# Patient Record
Sex: FEMALE | Race: WHITE | NOT HISPANIC OR LATINO | Employment: OTHER | URBAN - METROPOLITAN AREA
[De-identification: names, ages, dates, MRNs, and addresses within clinical notes are randomized per-mention and may not be internally consistent; named-entity substitution may affect disease eponyms.]

---

## 2020-11-17 ENCOUNTER — TELEPHONE (OUTPATIENT)
Dept: OBSTETRICS AND GYNECOLOGY | Facility: CLINIC | Age: 75
End: 2020-11-17

## 2020-11-17 NOTE — TELEPHONE ENCOUNTER
Spoke with patient. Two pt identifiers confirmed.  Pt will call back mid January to schedule.. Patient verbalized understanding.

## 2020-11-17 NOTE — TELEPHONE ENCOUNTER
----- Message from Vidya Jones sent at 11/17/2020  1:53 PM CST -----  Regarding: patient appointment access  Contact: patient  Patient would like to schedule a new patient GYN appointment with Dr. Love. Patient has Medicare and is 75. Please call back at [phone].

## 2020-11-17 NOTE — TELEPHONE ENCOUNTER
----- Message from Vidya Jones sent at 11/17/2020  3:03 PM CST -----  Regarding: patient returning a call  Contact: patient  Type:  Patient Returning Call    Who Called:patient  Who Left Message for Patient:nurse  Does the patient know what this is regarding?:yes  Would the patient rather a call back or a response via [facility]? Call back  Best Call Back Number:[phone]   Additional Information: n/a

## 2021-01-27 ENCOUNTER — TELEPHONE (OUTPATIENT)
Dept: OBSTETRICS AND GYNECOLOGY | Facility: CLINIC | Age: 76
End: 2021-01-27

## 2021-03-09 ENCOUNTER — OFFICE VISIT (OUTPATIENT)
Dept: OBSTETRICS AND GYNECOLOGY | Facility: CLINIC | Age: 76
End: 2021-03-09
Payer: MEDICARE

## 2021-03-09 VITALS
HEIGHT: 68 IN | BODY MASS INDEX: 28.44 KG/M2 | DIASTOLIC BLOOD PRESSURE: 74 MMHG | WEIGHT: 187.63 LBS | SYSTOLIC BLOOD PRESSURE: 164 MMHG | HEART RATE: 93 BPM

## 2021-03-09 DIAGNOSIS — N95.1 MENOPAUSAL AND FEMALE CLIMACTERIC STATES: ICD-10-CM

## 2021-03-09 DIAGNOSIS — N95.2 ATROPHIC VAGINITIS: ICD-10-CM

## 2021-03-09 DIAGNOSIS — B37.2 YEAST DERMATITIS: ICD-10-CM

## 2021-03-09 DIAGNOSIS — Z01.419 ENCOUNTER FOR WELL WOMAN EXAM WITH ROUTINE GYNECOLOGICAL EXAM: Primary | ICD-10-CM

## 2021-03-09 PROCEDURE — G0101 PR CA SCREEN;PELVIC/BREAST EXAM: ICD-10-PCS | Mod: S$GLB,,, | Performed by: OBSTETRICS & GYNECOLOGY

## 2021-03-09 PROCEDURE — G0101 CA SCREEN;PELVIC/BREAST EXAM: HCPCS | Mod: S$GLB,,, | Performed by: OBSTETRICS & GYNECOLOGY

## 2021-03-09 RX ORDER — CLOTRIMAZOLE AND BETAMETHASONE DIPROPIONATE 10; .64 MG/G; MG/G
CREAM TOPICAL
Qty: 15 G | Refills: 1 | Status: SHIPPED | OUTPATIENT
Start: 2021-03-09 | End: 2021-10-18

## 2021-03-09 RX ORDER — IRBESARTAN 300 MG/1
300 TABLET ORAL DAILY
COMMUNITY
Start: 2021-02-17

## 2021-03-09 RX ORDER — LEVOTHYROXINE SODIUM 50 UG/1
TABLET ORAL
COMMUNITY
Start: 2021-02-22

## 2021-03-09 RX ORDER — HYDROCHLOROTHIAZIDE 25 MG/1
25 TABLET ORAL DAILY
COMMUNITY
Start: 2021-02-08 | End: 2023-03-14

## 2021-03-09 RX ORDER — EZETIMIBE 10 MG/1
TABLET ORAL
COMMUNITY
Start: 2020-12-03

## 2021-10-17 DIAGNOSIS — B37.2 YEAST DERMATITIS: ICD-10-CM

## 2021-10-18 RX ORDER — CLOTRIMAZOLE AND BETAMETHASONE DIPROPIONATE 10; .64 MG/G; MG/G
CREAM TOPICAL
Qty: 15 G | Refills: 1 | Status: SHIPPED | OUTPATIENT
Start: 2021-10-18 | End: 2022-02-11 | Stop reason: SDUPTHER

## 2022-06-27 ENCOUNTER — TELEPHONE (OUTPATIENT)
Dept: OBSTETRICS AND GYNECOLOGY | Facility: CLINIC | Age: 77
End: 2022-06-27
Payer: MEDICARE

## 2022-06-27 NOTE — TELEPHONE ENCOUNTER
I spoke with vitor and scheduled her annual visit for July. She had to cancel her appointment in March because her  was dying and she just couldn't handle it.

## 2022-06-27 NOTE — TELEPHONE ENCOUNTER
----- Message from Shirley Carney sent at 6/27/2022 10:40 AM CDT -----  Regarding: Annual  Contact: Patient  Please call to schedule patient's annual with Dr Love at ph .[phone] (home)

## 2022-07-18 ENCOUNTER — TELEPHONE (OUTPATIENT)
Dept: OBSTETRICS AND GYNECOLOGY | Facility: CLINIC | Age: 77
End: 2022-07-18
Payer: MEDICARE

## 2022-07-18 NOTE — TELEPHONE ENCOUNTER
Attempted to contact patient, no answer. Left patient voice mail to return call to clinic regarding her wellness appointment that is scheduled too early.

## 2023-03-13 NOTE — PROGRESS NOTES
CC:  WELL WOMAN (menopausal since   )  Patient Care Team:  Leonard Duenas MD as PCP - General (Family Medicine)  Sami Weiss MD (Interventional Cardiology)    HPI:  Patient is a 77 y.o. who presents for her well woman exam today.  History reviewed with patient. Since last visit, pt lost her  (about a yr ago) but she is doing well with that now. Also had a heart attack and didn't know it but went to get checked for coivd when she had a cough and ended up not having covid but needed 4 stents  Patient is without complaints or concerns today.     The patient's last visit with me was on 3/9/2021 for wwe    HRT:  vaginal estrogen  HX ABNL PAPS: none    REVIEW OF PRIOR DATA/ HEALTH MAINTENANCE:  LAST ANNUAL:   2021    LAST MMG (screening)- 2022- normal at [facility]   LAST LABS- up to date with PCP     LAST COLOGARD-  - neg- Q 3 years   LAST DEXA- - unknown results at [facility]     Past Medical History:   Diagnosis Date    Atrophic vaginitis     Coronary artery disease     Elevated cholesterol     HTN (hypertension)     Hypothyroid     Menopausal and female climacteric states     Polymyalgia     Postmenopausal atrophic vaginitis     HRT    Yeast dermatitis      SURGICAL HX:   has a past surgical history that includes Cataract extraction; Blepharoptosis repair; and Coronary angioplasty with stent.    SOCIAL HX:    reports that she has never smoked. She has never used smokeless tobacco. She reports that she does not currently use alcohol. She reports that she does not use drugs.    FAMILY HX:   family history includes Breast cancer in her sister; Diabetes in her mother; Heart disease in her father; Liver disease in her mother. .    ALLERGIES:  Zithromax [azithromycin]    Current Outpatient Medications   Medication Instructions    amLODIPine (NORVASC) 5 MG tablet No dose, route, or frequency recorded.    clopidogreL (PLAVIX) 75 mg, Oral    clotrimazole-betamethasone 1-0.05% (LOTRISONE)  "cream APPLY EXTERNALLY TO THE AFFECTED AREA TWICE DAILY    [START ON 3/16/2023] conjugated estrogens (PREMARIN) 0.5 g, Vaginal, Twice weekly    ezetimibe (ZETIA) 10 mg tablet TK 1 T PO D    furosemide (LASIX) 20 MG tablet No dose, route, or frequency recorded.    irbesartan (AVAPRO) 300 mg, Oral, Daily    levothyroxine (SYNTHROID) 50 MCG tablet No dose, route, or frequency recorded.    metoprolol succinate (TOPROL-XL) 50 MG 24 hr tablet No dose, route, or frequency recorded.    PROLENSA 0.07 % Drop INSTILL 1 DROP IN LEFT EYE DAILY AS DIRECTED    REPATHA SURECLICK 140 mg/mL PnIj INJECT 1 PEN SUBCUTANEOUSLY EVERY 2 WEEKS     ROS:  CONST:  No fever, chills, fatigue or unexpected changes in weight  CV: No chest pain or palpitations  RESP:  No shortness of breath or cough  GI: No abd pain, vomiting, diarrhea, blood in stool, or changes in bowel mvmts  SKIN: No rashes or lesions  MUSCULOSKELETAL: No joint swelling or pain  PSYCH: No changes in mood or insomia  BREASTS: No asymmetry, lumps, pain, nipple discharge, or skin changes  :  No dysuria, urgency, frequency, hematuria or incontinence          No vag dc, itching, odor or dryness          No pelvic pain, dyspareunia, or abnormal vaginal bleeding    VITALS:  Blood pressure 128/60, pulse 65, height 5' 8" (1.727 m), weight 81.5 kg (179 lb 9.6 oz).  Body mass index is 27.31 kg/m².     PHYSICAL EXAM-  APPEARANCE: Well appearing, in no acute distress.  NECK: Neck symmetric without masses or thyromegaly.  CV:  Normal rate  PULM: Normal resp rate, no resp distress, normal resp effort  PSYCH:  Normal mood and affect, cooperative  SKIN: No rashes, lesions, or abnormal bruising  LYMPH: No inguinal or axillary adenopathy  ABD: Soft, without tenderness or masses. No palpable hernias or hsm  BREAST: Symmetrical, no nipple changes, no skin changes, No palpable masses   PELVIC:  VULVA: Normal female genitalia. No lesions.  URETHRAL MEATUS: No masses, no prolapse.  BLADDER/ URETHRA: " No masses or suprapubic tenderness  VAGINA: No lesions. +atrophic changes. No discharge  CVX: No lesions  UTERUS: Normal size/shape, mobile, non-tender  ADNEXA: No masses, tenderness, or fullness  ANUS/ PERINEUM: Normal tone.  No lesions.    *female chaperone present for entire exam    ASSESSMENT and PLAN:  Encounter for well woman exam with routine gynecological exam    Breast cancer screening by mammogram  -     Mammo Digital Screening [facility] w/ Jhonathan; Future; Expected date: 03/28/2023    Encounter for osteoporosis screening in asymptomatic postmenopausal patient  -     DXA Bone Density Axial Skeleton 1 or more sites; Future; Expected date: 03/28/2023    Atrophic vaginitis  -     conjugated estrogens (PREMARIN) vaginal cream; Place 0.5 g vaginally twice a week.  Dispense: 30 g; Refill: 4       FOLLOWUP:  1 year for wwe or sooner prn    COUNSELING:  Patient was counseled today on recommendation for yearly pelvic exam, current Pap guidelines, self breast exams, annual screening mammograms, routine screening colonoscopy , and bone density testing.  Discussed vitamin D and calcium supplements as well as weight bearing exercise to decrease risks. Encouraged patient to see her PCP for other health maintenance.

## 2023-03-14 ENCOUNTER — OFFICE VISIT (OUTPATIENT)
Dept: OBSTETRICS AND GYNECOLOGY | Facility: CLINIC | Age: 78
End: 2023-03-14
Payer: MEDICARE

## 2023-03-14 VITALS
BODY MASS INDEX: 27.22 KG/M2 | HEIGHT: 68 IN | SYSTOLIC BLOOD PRESSURE: 128 MMHG | HEART RATE: 65 BPM | WEIGHT: 179.63 LBS | DIASTOLIC BLOOD PRESSURE: 60 MMHG

## 2023-03-14 DIAGNOSIS — Z78.0 ENCOUNTER FOR OSTEOPOROSIS SCREENING IN ASYMPTOMATIC POSTMENOPAUSAL PATIENT: ICD-10-CM

## 2023-03-14 DIAGNOSIS — Z12.31 BREAST CANCER SCREENING BY MAMMOGRAM: ICD-10-CM

## 2023-03-14 DIAGNOSIS — Z01.419 ENCOUNTER FOR WELL WOMAN EXAM WITH ROUTINE GYNECOLOGICAL EXAM: Primary | ICD-10-CM

## 2023-03-14 DIAGNOSIS — N95.2 ATROPHIC VAGINITIS: ICD-10-CM

## 2023-03-14 DIAGNOSIS — Z13.820 ENCOUNTER FOR OSTEOPOROSIS SCREENING IN ASYMPTOMATIC POSTMENOPAUSAL PATIENT: ICD-10-CM

## 2023-03-14 PROCEDURE — G0101 CA SCREEN;PELVIC/BREAST EXAM: HCPCS | Mod: S$GLB,,, | Performed by: OBSTETRICS & GYNECOLOGY

## 2023-03-14 PROCEDURE — G0101 PR CA SCREEN;PELVIC/BREAST EXAM: ICD-10-PCS | Mod: S$GLB,,, | Performed by: OBSTETRICS & GYNECOLOGY

## 2023-03-14 RX ORDER — FUROSEMIDE 20 MG/1
TABLET ORAL
COMMUNITY
Start: 2023-02-13

## 2023-03-14 RX ORDER — CLOPIDOGREL BISULFATE 75 MG/1
75 TABLET ORAL
COMMUNITY
Start: 2023-02-15

## 2023-03-14 RX ORDER — METOPROLOL SUCCINATE 50 MG/1
TABLET, EXTENDED RELEASE ORAL
COMMUNITY
Start: 2023-03-10

## 2023-03-14 RX ORDER — AMLODIPINE BESYLATE 5 MG/1
TABLET ORAL
COMMUNITY
Start: 2023-03-10

## 2023-03-14 RX ORDER — BROMFENAC SODIUM 0.7 MG/ML
SOLUTION/ DROPS OPHTHALMIC
COMMUNITY
Start: 2023-01-02

## 2023-03-14 RX ORDER — EVOLOCUMAB 140 MG/ML
INJECTION, SOLUTION SUBCUTANEOUS
COMMUNITY
Start: 2023-02-21

## 2023-03-29 NOTE — PROGRESS NOTES
Please let pt know her dexa shows osteoporosis but I dont have her last one for comparison. I would send this to her pcp and have them take a look and make recommendations

## 2023-03-30 ENCOUNTER — TELEPHONE (OUTPATIENT)
Dept: OBSTETRICS AND GYNECOLOGY | Facility: CLINIC | Age: 78
End: 2023-03-30
Payer: MEDICARE

## 2023-04-10 ENCOUNTER — TELEPHONE (OUTPATIENT)
Dept: OBSTETRICS AND GYNECOLOGY | Facility: CLINIC | Age: 78
End: 2023-04-10
Payer: MEDICARE

## 2024-04-04 ENCOUNTER — OFFICE VISIT (OUTPATIENT)
Dept: OBSTETRICS AND GYNECOLOGY | Facility: CLINIC | Age: 79
End: 2024-04-04
Payer: MEDICARE

## 2024-04-04 VITALS
WEIGHT: 187 LBS | DIASTOLIC BLOOD PRESSURE: 65 MMHG | SYSTOLIC BLOOD PRESSURE: 144 MMHG | HEIGHT: 68 IN | BODY MASS INDEX: 28.34 KG/M2

## 2024-04-04 DIAGNOSIS — Z12.31 BREAST CANCER SCREENING BY MAMMOGRAM: Primary | ICD-10-CM

## 2024-04-04 DIAGNOSIS — N95.2 ATROPHIC VAGINITIS: ICD-10-CM

## 2024-04-04 DIAGNOSIS — N95.1 MENOPAUSAL STATE: ICD-10-CM

## 2024-04-04 PROCEDURE — 99459 PELVIC EXAMINATION: CPT | Mod: S$GLB,,, | Performed by: OBSTETRICS & GYNECOLOGY

## 2024-04-04 PROCEDURE — 99214 OFFICE O/P EST MOD 30 MIN: CPT | Mod: S$GLB,,, | Performed by: OBSTETRICS & GYNECOLOGY

## 2024-04-04 RX ORDER — METOPROLOL TARTRATE 50 MG/1
50 TABLET ORAL 2 TIMES DAILY
COMMUNITY
Start: 2024-01-09

## 2024-04-04 NOTE — PROGRESS NOTES
FOLLOW UP VISIT - menopausal since   Patient Care Team:  Leonard Duenas MD as PCP - General (Family Medicine)  Sami Weiss MD (Interventional Cardiology)    CC:  breast check, menopausal state, atrophic vaginitis    HPI:  Pt is a 78 y.o.  who is here for her breast check. Pt feels like she is stronger and better than the last year as it has been right at 2 years since her  passed away and has finally finished all the legal stuff with that. Sees cardiology this afternoon. Exercising using her recumbent bike and ballet bar. Uses her premarin cream twice weekly when she feels she is getting dry but has had no problems , dc, utis, or any other concerns. Is thinking she may just put it on the calendar to use it a couple     The patient's last visit with me was on 3/14/2023 for wwe    REVIEW OF PRIOR DATA/ HEALTH MAINTENANCE:  LAST ANNUAL:   2023   LAST LABS- does with PCP   LAST MMG (screening)- 2023-  [facility] COLOGARD-  - neg- Q 3 years   LAST DEXA- - osteoporosis at [facility] ABNL PAPS: none    HRT:  never used systemic hrt and  is CONTRAINDICATED    Past Medical History:   Diagnosis Date    Coronary artery disease     Elevated cholesterol     HTN (hypertension)     Hypothyroid     Menopausal and female climacteric states     Polymyalgia     Postmenopausal atrophic vaginitis     HRT     SURGICAL HX:   has a past surgical history that includes Cataract extraction; Blepharoptosis repair; and Coronary angioplasty with stent.  Family, obstetric, and social history reviewed and updated    Current Outpatient Medications   Medication Sig    amLODIPine (NORVASC) 5 MG tablet     clopidogreL (PLAVIX) 75 mg tablet Take 75 mg by mouth.    clotrimazole-betamethasone 1-0.05% (LOTRISONE) cream APPLY EXTERNALLY TO THE AFFECTED AREA TWICE DAILY    ezetimibe (ZETIA) 10 mg tablet TK 1 T PO D    furosemide (LASIX) 20 MG tablet     irbesartan (AVAPRO) 300 MG tablet Take 300 mg by  "mouth once daily.    levothyroxine (SYNTHROID) 50 MCG tablet     metoprolol tartrate (LOPRESSOR) 50 MG tablet Take 50 mg by mouth 2 (two) times daily.    PROLENSA 0.07 % Drop INSTILL 1 DROP IN LEFT EYE DAILY AS DIRECTED    REPATHA SURECLICK 140 mg/mL PnIj INJECT 1 PEN SUBCUTANEOUSLY EVERY 2 WEEKS    conjugated estrogens (PREMARIN) vaginal cream Place 0.5 g vaginally twice a week.    metoprolol succinate (TOPROL-XL) 50 MG 24 hr tablet      No current facility-administered medications for this visit.     VITALS:  Blood pressure (!) 144/65, height 5' 8" (1.727 m), weight 84.8 kg (187 lb).  Body mass index is 28.43 kg/m².    PHYSICAL EXAM-  APPEARANCE: Well appearing, in no acute distress.   CV/PULM: No resp distress, normal resp effort   PSYCH:  Normal mood and affect, cooperative   SKIN: No rashes, lesions, or abnormal bruising   ABD: Soft, without tenderness or masses.   BREAST: No skin changes or nipple dc. No palpable masses or tenderness  PELVIC:  VULVA: Normal female genitalia. No lesions.   URETHRAL MEATUS: No masses, no significant prolapse.   BLADDER/ URETHRA: No masses or suprapubic tenderness   VAGINA/ CVX: No lesions. +atrophic changes. No discharge   UTERUS: Normal size/shape, mobile, non-tender   ADNEXA: No masses, tenderness, or fullness   ANUS/ PERINEUM: Normal tone.  No lesions.           *patient verbally consented to exam and female chaperone present for entire exam     ASSESSMENT/ PLAN:  Breast cancer screening by mammogram  -     Mammo Digital Screening [facility]/ [facility]honathan; Future; Expected date: 04/18/2024    Menopausal state    Atrophic vaginitis  -     conjugated estrogens (PREMARIN) vaginal cream; Place 0.5 g vaginally twice a week.  Dispense: 30 g; Refill: 4      FOLLOWUP:  WWE or sooner prn        "

## 2024-05-03 ENCOUNTER — TELEPHONE (OUTPATIENT)
Dept: OBSTETRICS AND GYNECOLOGY | Facility: CLINIC | Age: 79
End: 2024-05-03
Payer: MEDICARE

## 2024-05-30 ENCOUNTER — DOCUMENTATION ONLY (OUTPATIENT)
Dept: OBSTETRICS AND GYNECOLOGY | Facility: CLINIC | Age: 79
End: 2024-05-30
Payer: MEDICARE

## 2025-04-16 NOTE — PROGRESS NOTES
FOLLOW UP VISIT - menopausal since   Patient Care Team:  Leonard Duenas MD as PCP - General (Family Medicine)  Sami Weiss MD (Interventional Cardiology)    CC:  breast check, menopausal state,   HPI:  Pt is a 79 y.o.  who is here for her breast check, needs rf on her boston vag cream- uses prn , not on a regular scheduled basis. Also wants a rf on her lotrisone cream that she uses for yeast in the folds    REVIEW OF PRIOR DATA/ HEALTH MAINTENANCE:  LAST ANNUAL:   2023    LAST MMG- 2024-  [facility] COLOGARD-  - neg- Q 3 years   LAST DEXA- - osteoporosis at [facility]    HRT- none  HX ABNL PAPS: none    Past Medical History:   Diagnosis Date    Coronary artery disease     Elevated cholesterol     HTN (hypertension)     Hypothyroid     Menopausal and female climacteric states     Polymyalgia     Postmenopausal atrophic vaginitis     HRT     SURGICAL HX:   has a past surgical history that includes Cataract extraction; Blepharoptosis repair; and Coronary angioplasty with stent.  Family, obstetric, and social history reviewed and updated    Current Outpatient Medications   Medication Instructions    amLODIPine (NORVASC) 5 MG tablet No dose, route, or frequency recorded.    azelaic acid (AZELEX) 15 % gel APPLY TO THE AFFECTED AREAS ONCE DAILY    azelastine (ASTELIN) 137 mcg (0.1 %) nasal spray 2 sprays, 2 times daily    bromfenac (XIBROM) 0.09 % ophthalmic solution INSTILL 1 DROP IN LEFT EYE EVERY DAY    clobetasol 0.05% (TEMOVATE) 0.05 % Oint 1 application , 2 times daily    clopidogreL (PLAVIX) 75 mg    clotrimazole-betamethasone 1-0.05% (LOTRISONE) cream APPLY EXTERNALLY TO THE AFFECTED AREA TWICE DAILY    conjugated estrogens (PREMARIN) 0.5 g, Vaginal, Twice weekly    difluprednate (DUREZOL) 0.05 % Drop ophthalmic solution INSTILL 1 DROP IN LEFT EYE DAILY    erythromycin (ROMYCIN) ophthalmic ointment APPLY A SMALL AMOUNT ALONG LEFT LOWER LID THREE TIMES DAILY    ezetimibe  "(ZETIA) 10 mg tablet TK 1 T PO D    furosemide (LASIX) 20 MG tablet No dose, route, or frequency recorded.    irbesartan (AVAPRO) 300 mg, Daily    levothyroxine (SYNTHROID) 50 MCG tablet No dose, route, or frequency recorded.    metoprolol succinate (TOPROL-XL) 50 MG 24 hr tablet No dose, route, or frequency recorded.    PROLENSA 0.07 % Drop INSTILL 1 DROP IN LEFT EYE DAILY AS DIRECTED    REPATHA SURECLICK 140 mg/mL PnIj INJECT 1 PEN SUBCUTANEOUSLY EVERY 2 WEEKS     VITALS:  Blood pressure 139/76, height 5' 8" (1.727 m), weight 83.5 kg (184 lb).  Body mass index is 27.98 kg/m².    PHYSICAL EXAM-  APPEARANCE: Well appearing, in no acute distress.   CV/PULM: No resp distress, normal resp effort   PSYCH:  Normal mood and affect, cooperative   SKIN: No rashes, lesions, or abnormal bruising   ABD: Soft, without tenderness or masses.   BREAST: No skin changes or nipple dc. No palpable masses or tenderness  PELVIC:  VULVA: Normal female genitalia. No lesions.   URETHRAL MEATUS: No masses, no significant prolapse.   BLADDER/ URETHRA: No masses or suprapubic tenderness   VAGINA/ CVX: No lesions. +atrophic changes. No discharge   UTERUS: Normal size/shape, mobile, non-tender   ADNEXA: No masses, tenderness, or fullness   ANUS/ PERINEUM: Normal tone.  No lesions.           *patient verbally consented to exam and female chaperone present for entire exam     ASSESSMENT/ PLAN:  Breast cancer screening by mammogram  -     Mammo Digital Screening [facility]/ [facility] ([facility]); Future; Expected date: 05/01/2025    Yeast dermatitis  -     clotrimazole-betamethasone 1-0.05% (LOTRISONE) cream; APPLY EXTERNALLY TO THE AFFECTED AREA TWICE DAILY  Dispense: 15 g; Refill: 1    Atrophic vaginitis  -     conjugated estrogens (PREMARIN) vaginal cream; Place 0.5 g vaginally twice a week.  Dispense: 30 g; Refill: 4      FOLLOWUP:  WWE or sooner prn      "

## 2025-04-17 ENCOUNTER — OFFICE VISIT (OUTPATIENT)
Dept: OBSTETRICS AND GYNECOLOGY | Facility: CLINIC | Age: 80
End: 2025-04-17
Payer: MEDICARE

## 2025-04-17 VITALS
HEIGHT: 68 IN | SYSTOLIC BLOOD PRESSURE: 139 MMHG | DIASTOLIC BLOOD PRESSURE: 76 MMHG | BODY MASS INDEX: 27.89 KG/M2 | WEIGHT: 184 LBS

## 2025-04-17 DIAGNOSIS — B37.2 YEAST DERMATITIS: ICD-10-CM

## 2025-04-17 DIAGNOSIS — N95.2 ATROPHIC VAGINITIS: ICD-10-CM

## 2025-04-17 DIAGNOSIS — Z12.31 BREAST CANCER SCREENING BY MAMMOGRAM: Primary | ICD-10-CM

## 2025-04-17 PROCEDURE — 99214 OFFICE O/P EST MOD 30 MIN: CPT | Mod: S$PBB,,, | Performed by: OBSTETRICS & GYNECOLOGY

## 2025-04-17 RX ORDER — AZELAIC ACID 0.15 G/G
GEL TOPICAL
COMMUNITY
Start: 2024-11-07

## 2025-04-17 RX ORDER — CLOBETASOL PROPIONATE 0.5 MG/G
1 OINTMENT TOPICAL 2 TIMES DAILY
COMMUNITY
Start: 2024-11-07

## 2025-04-17 RX ORDER — DIFLUPREDNATE OPHTHALMIC 0.5 MG/ML
EMULSION OPHTHALMIC
COMMUNITY
Start: 2025-01-27

## 2025-04-17 RX ORDER — ERYTHROMYCIN 5 MG/G
OINTMENT OPHTHALMIC
COMMUNITY
Start: 2025-02-10

## 2025-04-17 RX ORDER — CLOTRIMAZOLE AND BETAMETHASONE DIPROPIONATE 10; .64 MG/G; MG/G
CREAM TOPICAL
Qty: 15 G | Refills: 1 | Status: SHIPPED | OUTPATIENT
Start: 2025-04-17

## 2025-04-17 RX ORDER — BROMFENAC 1.03 MG/ML
SOLUTION/ DROPS OPHTHALMIC
COMMUNITY
Start: 2024-12-18

## 2025-04-17 RX ORDER — AZELASTINE 1 MG/ML
2 SPRAY, METERED NASAL 2 TIMES DAILY
COMMUNITY
Start: 2024-12-17